# Patient Record
Sex: MALE | Race: ASIAN | NOT HISPANIC OR LATINO
[De-identification: names, ages, dates, MRNs, and addresses within clinical notes are randomized per-mention and may not be internally consistent; named-entity substitution may affect disease eponyms.]

---

## 2019-08-21 PROBLEM — Z00.00 ENCOUNTER FOR PREVENTIVE HEALTH EXAMINATION: Status: ACTIVE | Noted: 2019-08-21

## 2019-08-22 ENCOUNTER — TRANSCRIPTION ENCOUNTER (OUTPATIENT)
Age: 44
End: 2019-08-22

## 2019-08-22 ENCOUNTER — APPOINTMENT (OUTPATIENT)
Dept: SURGERY | Facility: CLINIC | Age: 44
End: 2019-08-22
Payer: COMMERCIAL

## 2019-08-22 VITALS
TEMPERATURE: 98.6 F | BODY MASS INDEX: 30.82 KG/M2 | WEIGHT: 185 LBS | OXYGEN SATURATION: 96 % | DIASTOLIC BLOOD PRESSURE: 71 MMHG | HEIGHT: 65 IN | SYSTOLIC BLOOD PRESSURE: 113 MMHG | HEART RATE: 55 BPM

## 2019-08-22 DIAGNOSIS — Z86.39 PERSONAL HISTORY OF OTHER ENDOCRINE, NUTRITIONAL AND METABOLIC DISEASE: ICD-10-CM

## 2019-08-22 DIAGNOSIS — Z86.19 PERSONAL HISTORY OF OTHER INFECTIOUS AND PARASITIC DISEASES: ICD-10-CM

## 2019-08-22 DIAGNOSIS — Z78.9 OTHER SPECIFIED HEALTH STATUS: ICD-10-CM

## 2019-08-22 DIAGNOSIS — Z87.891 PERSONAL HISTORY OF NICOTINE DEPENDENCE: ICD-10-CM

## 2019-08-22 DIAGNOSIS — K80.20 CALCULUS OF GALLBLADDER W/OUT CHOLECYSTITIS W/OUT OBSTRUCTION: ICD-10-CM

## 2019-08-22 DIAGNOSIS — R10.11 RIGHT UPPER QUADRANT PAIN: ICD-10-CM

## 2019-08-22 PROCEDURE — 99243 OFF/OP CNSLTJ NEW/EST LOW 30: CPT

## 2019-08-22 RX ORDER — TENOFOVIR ALAFENAMIDE 25 MG/1
TABLET ORAL
Refills: 0 | Status: ACTIVE | COMMUNITY

## 2019-08-22 NOTE — PLAN
[FreeTextEntry1] : Patient with symptomatic gallstones and RUQ abdominal pain. Had a long d/w the pt. All the options, benefits and risks of laparoscopic cholecystectomy was discussed. The potential to go open, the small potential for bleeding, CBD injury, bile leak and other related complications were discussed. All the questions were answered to her satisfaction. Will schedule at [facility]\par \par PST/Medical clearance from PCP.

## 2019-08-22 NOTE — PHYSICAL EXAM
[Normal Breath Sounds] : Normal breath sounds [Normal Rate and Rhythm] : normal rate and rhythm [Alert] : alert [Oriented to Person] : oriented to person [Oriented to Place] : oriented to place [Oriented to Time] : oriented to time [Calm] : calm [JVD] : no jugular venous distention  [de-identified] : A/Ox3; NAD [de-identified] : EOMI, sclera anicteric  [de-identified] : abd is soft, NT/ND [de-identified] : no LE edema  [de-identified] : has hypopigmented lesions c/w vitiligo to UE, b/l

## 2019-08-22 NOTE — DATA REVIEWED
[FreeTextEntry1] : \par  \par    \par Patient: MATILDE JESUS\par YOB: 1975\par Phone: [phone]\par MRN: [id number] Acc: [id number]\par Date of Exam: 07-\par  \par \par  \par  \par   \par   \par   \par \par \par \par  \par EXAM:  ULTRASOUND ABDOMEN LIMITED\par \par HISTORY:  Intermittent right upper quadrant pain x2 weeks. History of hepatitis B.\par \par TECHNIQUE:  Using real-time ultrasonography, the right upper quadrant of the abdomen was imaged. Longitudinal and transverse images were obtained. Grayscale and color Doppler imaging is utilized. Static images are provided for review. \par \par COMPARISON:  No old studies are available. \par \par FINDINGS:  The liver has normal echotexture without focal lesion.  Portal vein demonstrates normal hepatopedal flow.\par \par Gallstones are suspected versus small sludge balls.\par \par The common bile duct was not identified. No intrahepatic ductal dilatation.\par \par The head of the pancreas appeared normal. The visualized portion of the body of the pancreas is within normal limits.  Tail of the pancreas is obscured by overlying bowel gas. \par \par The right kidney measures 11.2 x 4.8 x 5.0 cm. Normal echogenicity. No evidence for stone or hydronephrosis.\par \par Visualized aorta is not dilated with normal blood flow. Proximal aorta is obscured by gas.\par \par Normal flow identified within the inferior vena cava..\par \par IMPRESSION:  \par 1. Unremarkable appearing liver\par 2. Probable gallstones with poor visualization of the common bile duct. \par \par \par \par  \par

## 2019-08-22 NOTE — CONSULT LETTER
[Dear  ___] : Dear  [unfilled], [Consult Letter:] : I had the pleasure of evaluating your patient, [unfilled]. [Consult Closing:] : Thank you very much for allowing me to participate in the care of this patient.  If you have any questions, please do not hesitate to contact me. [Sincerely,] : Sincerely, [FreeTextEntry3] : Arsenio Lim MD\par

## 2019-08-22 NOTE — REVIEW OF SYSTEMS
[Fever] : no fever [Shortness Of Breath] : no shortness of breath [Chills] : no chills [Chest Pain] : no chest pain [Wheezing] : no wheezing [Hesitancy] : no urinary hesitancy [Arthralgias] : no arthralgias [Dizziness] : no dizziness [Joint Pain] : no joint pain [Muscle Weakness] : no muscle weakness [Anxiety] : no anxiety [Swollen Glands] : no swollen glands [de-identified] : has vitiligo to UE, bilaterally  [FreeTextEntry7] : has intermittent RUQ abdominal pain

## 2019-08-22 NOTE — HISTORY OF PRESENT ILLNESS
[de-identified] : Patient is a 42 y/o M w PMHX HBV, presently taking medication, referred for consultation visit, c/o gallstones. Patient had an Abd US 07/16/19 for intermittent RUQ abdominal pain; results c/w probable gallstones with poor visualization of the CBD. Patient reports intermittent RUQ abdominal pain. He reports severe pain 2-3 x over the past month, with the most recent occurrence after eating a meal. No nausea/vomiting.

## 2019-09-06 ENCOUNTER — OUTPATIENT (OUTPATIENT)
Dept: OUTPATIENT SERVICES | Facility: HOSPITAL | Age: 44
LOS: 1 days | End: 2019-09-06
Payer: COMMERCIAL

## 2019-09-06 VITALS
TEMPERATURE: 98 F | HEIGHT: 65 IN | SYSTOLIC BLOOD PRESSURE: 118 MMHG | OXYGEN SATURATION: 99 % | RESPIRATION RATE: 16 BRPM | DIASTOLIC BLOOD PRESSURE: 82 MMHG | WEIGHT: 149.91 LBS | HEART RATE: 72 BPM

## 2019-09-06 DIAGNOSIS — K80.20 CALCULUS OF GALLBLADDER WITHOUT CHOLECYSTITIS WITHOUT OBSTRUCTION: ICD-10-CM

## 2019-09-06 DIAGNOSIS — Z01.818 ENCOUNTER FOR OTHER PREPROCEDURAL EXAMINATION: ICD-10-CM

## 2019-09-06 LAB — BLD GP AB SCN SERPL QL: SIGNIFICANT CHANGE UP

## 2019-09-06 PROCEDURE — G0463: CPT

## 2019-09-06 PROCEDURE — 36415 COLL VENOUS BLD VENIPUNCTURE: CPT

## 2019-09-06 PROCEDURE — 86901 BLOOD TYPING SEROLOGIC RH(D): CPT

## 2019-09-06 PROCEDURE — 86900 BLOOD TYPING SEROLOGIC ABO: CPT

## 2019-09-06 PROCEDURE — 86850 RBC ANTIBODY SCREEN: CPT

## 2019-09-06 RX ORDER — SODIUM CHLORIDE 9 MG/ML
3 INJECTION INTRAMUSCULAR; INTRAVENOUS; SUBCUTANEOUS EVERY 8 HOURS
Refills: 0 | Status: DISCONTINUED | OUTPATIENT
Start: 2019-09-11 | End: 2019-09-19

## 2019-09-06 NOTE — H&P PST ADULT - NSICDXPASTMEDICALHX_GEN_ALL_CORE_FT
PAST MEDICAL HISTORY:  Calculus of gall bladder and bile duct with nonacute cholecystitis with obstruction     Hepatitis B

## 2019-09-06 NOTE — H&P PST ADULT - HISTORY OF PRESENT ILLNESS
44 y/o male presented with diagnosis of calculus of gall bladder for 2-3 months. He is scheduled for Laparoscopic cholecystectomy, with cholangiography on 9/11/19.

## 2019-09-06 NOTE — H&P PST ADULT - RS GEN PE MLT RESP DETAILS PC
respirations non-labored/clear to auscultation bilaterally/good air movement/normal/breath sounds equal/airway patent

## 2019-09-06 NOTE — H&P PST ADULT - ASSESSMENT
42 y/o male with Hepatitis B and calculus of the gall bladder is scheduled for Laparoscopic cholecystectomy, possible open on 9/11/19.

## 2019-09-06 NOTE — H&P PST ADULT - NSICDXPROBLEM_GEN_ALL_CORE_FT
PROBLEM DIAGNOSES  Problem: Calculus of gallbladder  Assessment and Plan: Laparoscopic cholecystectomy possible open

## 2019-09-10 ENCOUNTER — TRANSCRIPTION ENCOUNTER (OUTPATIENT)
Age: 44
End: 2019-09-10

## 2019-09-11 ENCOUNTER — RESULT REVIEW (OUTPATIENT)
Age: 44
End: 2019-09-11

## 2019-09-11 ENCOUNTER — OUTPATIENT (OUTPATIENT)
Dept: OUTPATIENT SERVICES | Facility: HOSPITAL | Age: 44
LOS: 1 days | End: 2019-09-11
Payer: COMMERCIAL

## 2019-09-11 ENCOUNTER — APPOINTMENT (OUTPATIENT)
Dept: SURGERY | Facility: HOSPITAL | Age: 44
End: 2019-09-11

## 2019-09-11 VITALS
HEART RATE: 72 BPM | HEIGHT: 65 IN | OXYGEN SATURATION: 97 % | WEIGHT: 149.91 LBS | TEMPERATURE: 98 F | SYSTOLIC BLOOD PRESSURE: 118 MMHG | DIASTOLIC BLOOD PRESSURE: 86 MMHG | RESPIRATION RATE: 18 BRPM

## 2019-09-11 VITALS
OXYGEN SATURATION: 99 % | SYSTOLIC BLOOD PRESSURE: 103 MMHG | RESPIRATION RATE: 16 BRPM | DIASTOLIC BLOOD PRESSURE: 64 MMHG | HEART RATE: 81 BPM

## 2019-09-11 DIAGNOSIS — K80.20 CALCULUS OF GALLBLADDER WITHOUT CHOLECYSTITIS WITHOUT OBSTRUCTION: ICD-10-CM

## 2019-09-11 LAB — BLD GP AB SCN SERPL QL: SIGNIFICANT CHANGE UP

## 2019-09-11 PROCEDURE — 47562 LAPAROSCOPIC CHOLECYSTECTOMY: CPT | Mod: AS

## 2019-09-11 PROCEDURE — 47562 LAPAROSCOPIC CHOLECYSTECTOMY: CPT

## 2019-09-11 PROCEDURE — 86900 BLOOD TYPING SEROLOGIC ABO: CPT

## 2019-09-11 PROCEDURE — 88304 TISSUE EXAM BY PATHOLOGIST: CPT | Mod: 26

## 2019-09-11 PROCEDURE — 88304 TISSUE EXAM BY PATHOLOGIST: CPT

## 2019-09-11 PROCEDURE — 86850 RBC ANTIBODY SCREEN: CPT

## 2019-09-11 PROCEDURE — 86901 BLOOD TYPING SEROLOGIC RH(D): CPT

## 2019-09-11 PROCEDURE — 36415 COLL VENOUS BLD VENIPUNCTURE: CPT

## 2019-09-11 RX ORDER — TENOFOVIR DISOPROXIL FUMARATE 300 MG/1
1 TABLET, FILM COATED ORAL
Qty: 0 | Refills: 0 | DISCHARGE

## 2019-09-11 RX ORDER — ACETAMINOPHEN 500 MG
1000 TABLET ORAL ONCE
Refills: 0 | Status: COMPLETED | OUTPATIENT
Start: 2019-09-11 | End: 2019-09-11

## 2019-09-11 RX ORDER — SODIUM CHLORIDE 9 MG/ML
1000 INJECTION, SOLUTION INTRAVENOUS
Refills: 0 | Status: DISCONTINUED | OUTPATIENT
Start: 2019-09-11 | End: 2019-09-11

## 2019-09-11 RX ORDER — LEVOCETIRIZINE DIHYDROCHLORIDE 0.5 MG/ML
1 SOLUTION ORAL
Qty: 0 | Refills: 0 | DISCHARGE

## 2019-09-11 RX ORDER — ONDANSETRON 8 MG/1
4 TABLET, FILM COATED ORAL EVERY 6 HOURS
Refills: 0 | Status: DISCONTINUED | OUTPATIENT
Start: 2019-09-11 | End: 2019-09-19

## 2019-09-11 RX ORDER — OXYCODONE AND ACETAMINOPHEN 5; 325 MG/1; MG/1
1 TABLET ORAL EVERY 4 HOURS
Refills: 0 | Status: DISCONTINUED | OUTPATIENT
Start: 2019-09-11 | End: 2019-09-11

## 2019-09-11 RX ORDER — HYDROMORPHONE HYDROCHLORIDE 2 MG/ML
0.5 INJECTION INTRAMUSCULAR; INTRAVENOUS; SUBCUTANEOUS
Refills: 0 | Status: DISCONTINUED | OUTPATIENT
Start: 2019-09-11 | End: 2019-09-11

## 2019-09-11 RX ADMIN — HYDROMORPHONE HYDROCHLORIDE 0.5 MILLIGRAM(S): 2 INJECTION INTRAMUSCULAR; INTRAVENOUS; SUBCUTANEOUS at 10:00

## 2019-09-11 RX ADMIN — HYDROMORPHONE HYDROCHLORIDE 0.5 MILLIGRAM(S): 2 INJECTION INTRAMUSCULAR; INTRAVENOUS; SUBCUTANEOUS at 09:48

## 2019-09-11 RX ADMIN — HYDROMORPHONE HYDROCHLORIDE 0.5 MILLIGRAM(S): 2 INJECTION INTRAMUSCULAR; INTRAVENOUS; SUBCUTANEOUS at 09:31

## 2019-09-11 RX ADMIN — Medication 400 MILLIGRAM(S): at 09:32

## 2019-09-11 RX ADMIN — Medication 1000 MILLIGRAM(S): at 09:48

## 2019-09-11 NOTE — ASU DISCHARGE PLAN (ADULT/PEDIATRIC) - CARE PROVIDER_API CALL
Arsenio Lim (MD)  Surgery  [address], [place] [id number]  Phone: [phone]  Fax: [phone]  Follow Up Time:

## 2019-09-11 NOTE — ASU DISCHARGE PLAN (ADULT/PEDIATRIC) - CALL YOUR DOCTOR IF YOU HAVE ANY OF THE FOLLOWING:
Unable to urinate/Fever greater than (need to indicate Fahrenheit or Celsius)/Wound/Surgical Site with redness, or foul smelling discharge or pus/Inability to tolerate liquids or foods/Swelling that gets worse/Pain not relieved by Medications/Numbness, tingling, color or temperature change to extremity/Nausea and vomiting that does not stop/Bleeding that does not stop

## 2019-09-12 PROBLEM — K80.61: Chronic | Status: ACTIVE | Noted: 2019-09-06

## 2019-09-12 PROBLEM — B19.10 UNSPECIFIED VIRAL HEPATITIS B WITHOUT HEPATIC COMA: Chronic | Status: ACTIVE | Noted: 2019-09-06

## 2019-09-17 ENCOUNTER — APPOINTMENT (OUTPATIENT)
Dept: SURGERY | Facility: CLINIC | Age: 44
End: 2019-09-17
Payer: COMMERCIAL

## 2019-09-17 PROCEDURE — 99024 POSTOP FOLLOW-UP VISIT: CPT

## 2019-09-17 NOTE — HISTORY OF PRESENT ILLNESS
[de-identified] : Patient is a 44 y/o M who presents to the office for postop visit, he is s/p laparoscopic cholecystectomy, 09/11/19. Path results are pending. \par Patient is without reported complaints, denies abdominal pain. No nausea/vomiting. He is having regular BM's and tolerating a regular diet. No fevers/chills.

## 2019-09-17 NOTE — PHYSICAL EXAM
[Normal Rate and Rhythm] : normal rate and rhythm [Normal Breath Sounds] : Normal breath sounds [No Rash or Lesion] : No rash or lesion [Alert] : alert [Oriented to Person] : oriented to person [Oriented to Place] : oriented to place [Oriented to Time] : oriented to time [Calm] : calm [de-identified] : A/Ox3; NAD. appears comfortable [de-identified] : EOMI, sclera anicteric  [de-identified] : supple, no JVD [de-identified] : Abdomen soft and non tender. Wounds healing well. Port sites with no erythema or drainage.

## 2019-09-17 NOTE — PLAN
[FreeTextEntry1] : patient will follow up if needed. Warning signs, follow up, and restrictions were discussed with the patient.\par \par Post operative wound care, activity and restrictions/precautions were reinforced. \par Patient was instructed to refrain from any heavy lifting >10-15 lbs for at least 4 weeks post operatively. \par \par Patient's questions and concerns addressed.\par

## 2019-09-17 NOTE — ASSESSMENT
[FreeTextEntry1] : Patient s/p laparoscopic cholecystectomy, 09/11/19. Path results are pending. \par Patient is doing well, with excellent post-operative recovery. All surgical incisions are healing well and as expected. There is no evidence of infection or complication, and patient is progressing as expected. Patient denies abdominal pain. No nausea/vomiting. He is having regular BM's and tolerating a regular diet. No fevers/chills.

## 2019-09-17 NOTE — REASON FOR VISIT
[Follow-Up: _____] : a [unfilled] follow-up visit [FreeTextEntry1] : s/p laparoscopic cholecystectomy, 09/11/19

## 2019-09-19 LAB — SURGICAL PATHOLOGY STUDY: SIGNIFICANT CHANGE UP

## 2019-11-26 NOTE — ASU PATIENT PROFILE, ADULT - TEACHING/LEARNING LEARNING PREFERENCES
Body Location Override (Optional): right posterior lower leg Wound Evaluated By (Optional): Oliver Beckett DO Wound Color?: pink Follow Up Time Frame (Optional): weeks Wound Crusting?: clean Patient To Follow-Up With?: our clinic Add 13267 Cpt? (Important Note: In 2017 The Use Of 24890 Is Being Tracked By Cms To Determine Future Global Period Reimbursement For Global Periods): yes Follow Up Units (Optional): 1 Wound Diameter In Cm(Optional): 0 Sutures?: intact Detail Level: Detailed Wound Edema?: mild verbal instruction

## 2021-02-16 NOTE — ASU PATIENT PROFILE, ADULT - NSALCOHOLTYPE_GEN__A_CORE_SD
1. I was told the name of the physician that took care of my child while in the hospital.    2. I have been told about any important findings on my child's physical exam and my child's plan of care.    3. The doctor clearly explained my child's diagnosis and other possible diagnoses that were considered.    4. My child's doctor explained all the tests that were done and their results (if available). I understand that some of the test results may not be ready before we go home and I was told how I can get these results. I understand that a summary of my child's hospitalization and important test results will be shared with my child's outpatient doctor.    5. My child's doctor talked to me about what I need to do when we go home.    6. I understand what signs and symptoms to watch for. I understand what symptoms I would need to call my doctor for and/or return to the hospital.    7. I have the phone number to call the hospital for results and/or questions after I leave the hospital.
beer/occasionally

## 2023-04-04 NOTE — H&P PST ADULT - ENMT
Pre-procedure instructions:  Detailed message left by nurse.   details… No oral lesions; no gross abnormalities
